# Patient Record
Sex: MALE | Race: WHITE | NOT HISPANIC OR LATINO | URBAN - METROPOLITAN AREA
[De-identification: names, ages, dates, MRNs, and addresses within clinical notes are randomized per-mention and may not be internally consistent; named-entity substitution may affect disease eponyms.]

---

## 2023-08-15 LAB
GRAM STAIN: ABNORMAL
TISSUE/WOUND CULTURE/SMEAR: ABNORMAL
TISSUE/WOUND CULTURE/SMEAR: ABNORMAL

## 2023-10-15 ENCOUNTER — OFFICE VISIT (OUTPATIENT)
Dept: URGENT CARE | Facility: CLINIC | Age: 68
End: 2023-10-15
Payer: COMMERCIAL

## 2023-10-15 VITALS
RESPIRATION RATE: 18 BRPM | WEIGHT: 196 LBS | DIASTOLIC BLOOD PRESSURE: 68 MMHG | BODY MASS INDEX: 30.7 KG/M2 | OXYGEN SATURATION: 100 % | TEMPERATURE: 97.8 F | HEART RATE: 77 BPM | SYSTOLIC BLOOD PRESSURE: 120 MMHG

## 2023-10-15 DIAGNOSIS — L23.7 ALLERGIC CONTACT DERMATITIS DUE TO PLANTS, EXCEPT FOOD: Primary | ICD-10-CM

## 2023-10-15 PROBLEM — L23.9 ALLERGIC CONTACT DERMATITIS: Status: ACTIVE | Noted: 2023-10-15

## 2023-10-15 PROCEDURE — 1036F TOBACCO NON-USER: CPT | Performed by: PHYSICIAN ASSISTANT

## 2023-10-15 PROCEDURE — 1159F MED LIST DOCD IN RCRD: CPT | Performed by: PHYSICIAN ASSISTANT

## 2023-10-15 PROCEDURE — 1125F AMNT PAIN NOTED PAIN PRSNT: CPT | Performed by: PHYSICIAN ASSISTANT

## 2023-10-15 PROCEDURE — 99203 OFFICE O/P NEW LOW 30 MIN: CPT | Performed by: PHYSICIAN ASSISTANT

## 2023-10-15 RX ORDER — METFORMIN HYDROCHLORIDE 500 MG/1
TABLET ORAL
COMMUNITY
Start: 2023-04-11

## 2023-10-15 RX ORDER — ROSUVASTATIN CALCIUM 5 MG/1
TABLET, COATED ORAL
COMMUNITY
Start: 2023-08-01

## 2023-10-15 RX ORDER — ACETAMINOPHEN 160 MG/5ML
SUSPENSION, ORAL (FINAL DOSE FORM) ORAL
COMMUNITY
Start: 2023-07-11

## 2023-10-15 RX ORDER — METOPROLOL TARTRATE 25 MG/1
25 TABLET, FILM COATED ORAL
COMMUNITY
Start: 2023-03-14

## 2023-10-15 RX ORDER — IBUPROFEN 200 MG
TABLET ORAL
COMMUNITY

## 2023-10-15 RX ORDER — NITROGLYCERIN 0.4 MG/1
TABLET SUBLINGUAL
COMMUNITY
Start: 2023-03-14

## 2023-10-15 RX ORDER — VIT C/E/ZN/COPPR/LUTEIN/ZEAXAN 250MG-90MG
25 CAPSULE ORAL
COMMUNITY
Start: 2023-04-11

## 2023-10-15 RX ORDER — CETIRIZINE HYDROCHLORIDE 10 MG/1
TABLET ORAL
COMMUNITY

## 2023-10-15 RX ORDER — TICAGRELOR 90 MG/1
TABLET ORAL
COMMUNITY
Start: 2023-03-14

## 2023-10-15 RX ORDER — DEXTROMETHORPHAN HYDROBROMIDE, GUAIFENESIN 5; 100 MG/5ML; MG/5ML
LIQUID ORAL
COMMUNITY

## 2023-10-15 RX ORDER — ASPIRIN 81 MG/1
TABLET ORAL
COMMUNITY
Start: 2023-03-14

## 2023-10-15 RX ORDER — PREDNISONE 20 MG/1
20 TABLET ORAL 2 TIMES DAILY
Qty: 10 TABLET | Refills: 0 | Status: SHIPPED | OUTPATIENT
Start: 2023-10-15 | End: 2023-10-20

## 2023-10-15 RX ORDER — ATORVASTATIN CALCIUM 80 MG/1
TABLET, FILM COATED ORAL
COMMUNITY
Start: 2023-04-10

## 2023-10-15 RX ORDER — BLOOD SUGAR DIAGNOSTIC
STRIP MISCELLANEOUS
COMMUNITY
Start: 2023-05-01

## 2023-10-15 RX ORDER — FAMOTIDINE 20 MG/1
TABLET, FILM COATED ORAL
COMMUNITY
Start: 2023-03-15

## 2023-10-15 RX ORDER — ASCORBIC ACID 500 MG
TABLET ORAL
COMMUNITY

## 2023-10-15 ASSESSMENT — PAIN SCALES - GENERAL: PAINLEVEL: 2

## 2023-10-15 NOTE — PROGRESS NOTES
Subjective   Patient ID: Daryl Berger is a 67 y.o. male.    Patient is a 67-year-old male complains of an ongoing pruritic rash that he has developed to his bilateral arms and legs that he believes is secondary to contact with poison ivy or other plant allergen.  Patient reports that his symptoms are present for approximately the past 1 week.  Patient been taking over-the-counter histamines with no improvement in his symptoms.  Patient has no additional complaints of pain or illness at this time.        Rash        The following portions of the chart were reviewed this encounter and updated as appropriate:         Review of Systems   Skin:  Positive for rash.   All other systems reviewed and are negative.    Objective   Physical Exam  Constitutional:       Appearance: Normal appearance. He is normal weight.   HENT:      Head: Normocephalic and atraumatic.      Left Ear: External ear normal.      Nose: Nose normal.      Mouth/Throat:      Mouth: Mucous membranes are moist.      Pharynx: Oropharynx is clear.   Eyes:      Extraocular Movements: Extraocular movements intact.      Conjunctiva/sclera: Conjunctivae normal.      Pupils: Pupils are equal, round, and reactive to light.   Cardiovascular:      Rate and Rhythm: Normal rate and regular rhythm.      Pulses: Normal pulses.   Pulmonary:      Effort: Pulmonary effort is normal. No respiratory distress.      Breath sounds: Normal breath sounds. No stridor. No wheezing, rhonchi or rales.   Musculoskeletal:      Cervical back: Normal range of motion and neck supple.   Skin:     General: Skin is warm and dry.      Capillary Refill: Capillary refill takes less than 2 seconds.      Findings: Erythema and rash present.   Neurological:      General: No focal deficit present.      Mental Status: He is alert and oriented to person, place, and time.   Psychiatric:         Mood and Affect: Mood normal.         Behavior: Behavior normal.         Thought Content: Thought content  normal.         Judgment: Judgment normal.       Procedures    Assessment/Plan   Physical exam findings as noted above.  Patient was provided with a prescription for prednisone 20 mg and supportive care instructions were discussed.  Patient verbalizes clear understanding of same.    CLINICAL IMPRESSION:  Contact Dermatitis    Diagnoses and all orders for this visit:  Allergic contact dermatitis due to plants, except food  -     predniSONE (Deltasone) 20 mg tablet; Take 1 tablet (20 mg) by mouth 2 times a day for 5 days.

## 2024-09-11 ENCOUNTER — APPOINTMENT (OUTPATIENT)
Dept: AUDIOLOGY | Facility: CLINIC | Age: 69
End: 2024-09-11
Payer: COMMERCIAL

## 2024-09-11 ENCOUNTER — APPOINTMENT (OUTPATIENT)
Dept: OTOLARYNGOLOGY | Facility: CLINIC | Age: 69
End: 2024-09-11
Payer: COMMERCIAL